# Patient Record
Sex: MALE | Race: WHITE | NOT HISPANIC OR LATINO | Employment: OTHER | URBAN - METROPOLITAN AREA
[De-identification: names, ages, dates, MRNs, and addresses within clinical notes are randomized per-mention and may not be internally consistent; named-entity substitution may affect disease eponyms.]

---

## 2017-06-19 ENCOUNTER — PATIENT OUTREACH (OUTPATIENT)
Dept: HEALTH INFORMATION MANAGEMENT | Facility: OTHER | Age: 72
End: 2017-06-19

## 2017-06-19 NOTE — PROGRESS NOTES
Outcome: Pt. Requested to call back once he figures out schedule    WebIZ Checked & Epic Updated:  yes    HealthConnect Verified: no    Attempt # 1

## 2017-06-20 NOTE — PROGRESS NOTES
Attempt #:2    WebIZ Checked & Epic Updated: yes  HealthConnect Verified: no  Verify PCP: yes    Communication Preference Obtained: yes     Review Care Team: yes    Annual Wellness Visit Scheduling  1. Scheduling Status:Scheduled        Care Gap Scheduling (Attempt to Schedule EACH Overdue Care Gap!)     Health Maintenance Due   Topic Date Due   • Annual Wellness Visit  SCHEDULED          [employer] Activation: sent activation code  Active-Semi Vilma: no  Virtual Visits: no  Opt In to Text Messages: no

## 2017-06-21 ENCOUNTER — APPOINTMENT (OUTPATIENT)
Dept: MEDICAL GROUP | Age: 72
End: 2017-06-21
Payer: MEDICARE

## 2017-08-08 ENCOUNTER — OFFICE VISIT (OUTPATIENT)
Dept: MEDICAL GROUP | Facility: MEDICAL CENTER | Age: 72
End: 2017-08-08
Payer: MEDICARE

## 2017-08-08 DIAGNOSIS — R25.1 TREMORS OF NERVOUS SYSTEM: ICD-10-CM

## 2017-08-08 DIAGNOSIS — N40.0 BENIGN PROSTATIC HYPERPLASIA, PRESENCE OF LOWER URINARY TRACT SYMPTOMS UNSPECIFIED, UNSPECIFIED MORPHOLOGY: ICD-10-CM

## 2017-08-08 DIAGNOSIS — F32.A ANXIETY AND DEPRESSION: ICD-10-CM

## 2017-08-08 DIAGNOSIS — Z98.890 S/P COLONOSCOPY: ICD-10-CM

## 2017-08-08 DIAGNOSIS — E55.9 VITAMIN D INSUFFICIENCY: ICD-10-CM

## 2017-08-08 DIAGNOSIS — R00.1 BRADYCARDIA: ICD-10-CM

## 2017-08-08 DIAGNOSIS — F41.9 ANXIETY AND DEPRESSION: ICD-10-CM

## 2017-08-08 DIAGNOSIS — Z00.00 MEDICARE ANNUAL WELLNESS VISIT, INITIAL: Primary | ICD-10-CM

## 2017-08-08 PROCEDURE — G0438 PPPS, INITIAL VISIT: HCPCS | Performed by: NURSE PRACTITIONER

## 2017-08-08 RX ORDER — MAGNESIUM OXIDE 400 MG/1
400 TABLET ORAL DAILY
COMMUNITY
End: 2022-06-15

## 2017-08-08 ASSESSMENT — PATIENT HEALTH QUESTIONNAIRE - PHQ9
SUM OF ALL RESPONSES TO PHQ QUESTIONS 1-9: 7
5. POOR APPETITE OR OVEREATING: 2 - MORE THAN HALF THE DAYS
CLINICAL INTERPRETATION OF PHQ2 SCORE: 2

## 2017-08-08 NOTE — ASSESSMENT & PLAN NOTE
Today HR 45, asymptomatic, without dizziness, confusion, skin warm and dry, alert  States hr has gone as low as 39 and asymptomatic  Cardiology is encouraging pt to have a pacemaker placed and he is hesitant at this time

## 2017-08-08 NOTE — ASSESSMENT & PLAN NOTE
Chronic condition managed with current medical regimen  Per pt had labs drawn at [facility], was told wnl   Continue with diet management  Followed by Peggy Hassan M.D..

## 2017-08-08 NOTE — PROGRESS NOTES
CC:   Medicare Annual Wellness Visit    HPI:  Samir is a 72 y.o. here for Medicare Annual Wellness Visit    Patient Active Problem List    Diagnosis Date Noted   • Anxiety and depression 05/09/2014   • Bradycardia 04/30/2014   • Tremors of nervous system 05/16/2013   • HLD (hyperlipidemia) 10/24/2012   • Vitamin D insufficiency 09/18/2012   • S/P colonoscopy-2007 neg in [place] 08/23/2012   • BPH (benign prostatic hyperplasia) 08/23/2012     Current Outpatient Prescriptions   Medication Sig Dispense Refill   • magnesium oxide (MAG-OX) 400 MG Tab Take 400 mg by mouth every day.     • aspirin EC (ECOTRIN) 81 MG Tablet Delayed Response Take 81 mg by mouth every day.     • tamsulosin (FLOMAX) 0.4 MG capsule TAKE 1 CAPSULE BY MOUTH ONE-HALF HOUR AFTER BREAKFAST. 90 Cap 3   • polyethylene glycol 3350 (MIRALAX) Powder Take 17 g by mouth every day.     • SAW PALMETTO by Does not apply route.       No current facility-administered medications for this visit.      Current supplements: no  Chronic narcotic pain medicines: no  Allergies: Epinephrine; Morphine; and Sulfa antibiotics  Exercise: yes active  Current social contact/activities: Has support of family and friends      Screening:  Depression Screening    Little interest or pleasure in doing things?  1 - several days  Feeling down, depressed , or hopeless? 1 - several days  Trouble falling or staying asleep, or sleeping too much?  0 - not at all  Feeling tired or having little energy?  2 - more than half the days  Poor appetite or overeating?  2 - more than half the days  Feeling bad about yourself - or that you are a failure or have let yourself or your family down? 1 - several days  Trouble concentrating on things, such as reading the newspaper or watching television? 0 - not at all  Moving or speaking so slowly that other people could have noticed.  Or the opposite - being so fidgety or restless that you have been moving around a lot more than usual?  0 - not at  all  Thoughts that you would be better off dead, or of hurting yourself?  0 - not at all  Patient Health Questionnaire Score: 7    If depressive symptoms identified deferred to follow up visit unless specifically addressed in assessment and plan.    Interpretation of PHQ-9 Total Score   Score Severity   1-4 No Depression   5-9 Mild Depression   10-14 Moderate Depression   15-19 Moderately Severe Depression   20-27 Severe Depression      Screening for Cognitive Impairment    Three Minute Recall (apple, watch, amairani)  3/3    Draw clock face with all 12 numbers set to the hand to show 10 minutes past 11 o'clock  1 5  Cognitive concerns identified deferred for follow up unless specifically addressed in assessment and plan.    Fall Risk Assessment    Has the patient had two or more falls in the last year or any fall with injury in the last year?  No    Safety Assessment    Throw rugs on floor.  Yes  Handrails on all stairs.  Yes  Good lighting in all hallways.  Yes  Difficulty hearing.  No  Patient counseled about all safety risks that were identified.    Functional Assessment ADLs    Are there any barriers preventing you from cooking for yourself or meeting nutritional needs?  No.    Are there any barriers preventing you from driving safely or obtaining transportation?  No.    Are there any barriers preventing you from using a telephone or calling for help?  No.    Are there any barriers preventing you from shopping?  No.    Are there any barriers preventing you from taking care of your own finances?  No.    Are there any barriers preventing you from managing your medications?  No.    Are currently engaging any exercise or physical activity?  No.       Health Maintenance Summary                Annual Wellness Visit Overdue 1945     IMM PNEUMOCOCCAL 65+ (ADULT) LOW/MEDIUM RISK SERIES Postponed 12/20/2017 Originally 6/25/2010. Patient Refused     Done 8/23/1992 Imm Admin: Pneumococcal polysaccharide vaccine (PPSV-23)  "   IMM DTaP/Tdap/Td Vaccine Postponed 12/20/2017 Originally 6/25/1964. Patient Refused    IMM INFLUENZA Next Due 9/1/2017      Done 11/6/2012 Imm Admin: Influenza TIV (IM)    COLONOSCOPY Next Due 8/8/2024      Done 8/8/2014 AMB REFERRAL TO GI FOR COLONOSCOPY          Patient Care Team:  Peggy Hassan M.D. as PCP - General (Internal Medicine)  Alexandru Degroot PA-C as Consulting Physician (Urology)  Chay Sampson M.D. as Consulting Physician (Gastroenterology)  Sriram Flores M.D. as Consulting Physician (Cardiology)        Social History   Substance Use Topics   • Smoking status: Never Smoker    • Smokeless tobacco: Never Used   • Alcohol Use: No       Family History   Problem Relation Age of Onset   • Cancer Mother      Melanoma   • Other Father      low heart rate/pacemaker   • Cancer Father      prostate cancer/ sarcoma   • Lung Disease Paternal Uncle    • Cancer Paternal Uncle    • Stroke Maternal Grandmother    • Hypertension Paternal Grandmother      He  has a past medical history of Enlarged prostate; Heart rate slow; Allergy; and ASTHMA. He also has no past medical history of Encounter for long-term (current) use of other medications.   Past Surgical History   Procedure Laterality Date   • Tonsillectomy     • Open reduction         ROS:    Ostomy or other tubes or amputations: no  Chronic oxygen use no  Last eye exam 2/2017  : Denies incontinence.   Gait: Uses no assistive device   Hearing excellent.    Dentition good    Exam: Blood pressure 112/70, pulse 65, temperature 36.5 °C (97.7 °F), height 1.93 m (6' 4\"), weight 87.998 kg (194 lb), SpO2 98 %. Body mass index is 23.62 kg/(m^2).  Alert, oriented in no acute distress.  Eye contact is good, speech goal directed, affect calm      Assessment and Plan. The following treatment and monitoring plan is recommended for all problems as listed below:   Vitamin D insufficiency  Pt has elected not to take vit d at this time  No current vit d level in " "chart  Followed by Peggy Hassan M.D..     Tremors of nervous system  Per pt \"not so much\"  Has been evaluated by neurology  No intervention at this time    S/P colonoscopy-2007 neg in [place]  Last colonoscopy 2014, no polyps  Next due in 2024    HLD (hyperlipidemia)  Chronic condition managed with current medical regimen  Per pt had labs drawn at [facility], was told wnl   Continue with diet management  Followed by Peggy Hassan M.D..        Bradycardia  Today HR 45, asymptomatic, without dizziness, confusion, skin warm and dry, alert  States hr has gone as low as 39 and asymptomatic  Cardiology is encouraging pt to have a pacemaker placed and he is hesitant at this time    BPH (benign prostatic hyperplasia)  Chronic condition managed with current medical regimen  Stable per review with wakening once nocly, states good stream and able to empty bladder   Continue with current meds  Followed by Peggy Hassan M.D..        Anxiety and depression  Chronic condition managed with current medical regimen  Stable per review,feels well\"   Continue with surveillance  Followed by Peggy Hassan M.D..          Health Care Screening recommendations reviewed with patient today: per Patient Instructions  DPA/Advanced directive: .has an advanced directive - recom a copy be provided    Next office visit for recheck of chronic medical conditions is due with Peggy Hassan M.D. in 6 months          "

## 2017-08-08 NOTE — ASSESSMENT & PLAN NOTE
Chronic condition managed with current medical regimen  Stable per review with wakening once nocly, states good stream and able to empty bladder   Continue with current meds  Followed by Peggy Hassan M.D..

## 2017-08-08 NOTE — PATIENT INSTRUCTIONS
Continue with care through Peggy Hassan M.D..  Next Medicare Annual Wellness Visit is due in one year.    Continue care with specialists you are seeing for your chronic problems.    Attached is some preventative information for you to read.          Fall Prevention and Home Safety  Falls cause injuries and can affect all age groups. It is possible to prevent falls.   HOW TO PREVENT FALLS  · Wear shoes with rubber soles that do not have an opening for your toes.   · Keep the inside and outside of your house well lit.   · Use night lights throughout your home.   · Remove clutter from floors.   · Clean up floor spills.   · Remove throw rugs or fasten them to the floor with carpet tape.   · Do not place electrical cords across pathways.   · Put grab bars by your tub, shower, and toilet. Do not use towel bars as grab bars.   · Put handrails on both sides of the stairway. Fix loose handrails.   · Do not climb on stools or stepladders, if possible.   · Do not wax your floors.   · Repair uneven or unsafe sidewalks, walkways, or stairs.   · Keep items you use a lot within reach.   · Be aware of pets.   · Keep emergency numbers next to the telephone.   · Put smoke detectors in your home and near bedrooms.   Ask your doctor what other things you can do to prevent falls.  Document Released: 10/14/2010 Document Revised: 06/18/2013 Document Reviewed: 03/19/2013  ExitCare® Patient Information ©2013 [email or web address].    Exercise to Stay Healthy      Exercise helps you become and stay healthy.  EXERCISE IDEAS AND TIPS  Choose exercises that:  · You enjoy.   · Fit into your day.   You do not need to exercise really hard to be healthy. You can do exercises at a slow or medium level and stay healthy. You can:  · Stretch before and after working out.   · Try yoga, Pilates, or jesse chi.   · Lift weights.   · Walk fast, swim, jog, run, climb stairs, bicycle, dance, or rollerskate.   · Take aerobic classes.   Exercises that burn about 150  calories:  · Running 1 ½ miles in 15 minutes.   · Playing volleyball for 45 to 60 minutes.   · Washing and waxing a car for 45 to 60 minutes.   · Playing touch football for 45 minutes.   · Walking 1 ¾ miles in 35 minutes.   · Pushing a stroller 1 ½ miles in 30 minutes.   · Playing basketball for 30 minutes.   · Raking leaves for 30 minutes.   · Bicycling 5 miles in 30 minutes.   · Walking 2 miles in 30 minutes.   · Dancing for 30 minutes.   · Shoveling snow for 15 minutes.   · Swimming laps for 20 minutes.   · Walking up stairs for 15 minutes.   · Bicycling 4 miles in 15 minutes.   · Gardening for 30 to 45 minutes.   · Jumping rope for 15 minutes.   · Washing windows or floors for 45 to 60 minutes.     One suggestion is to start walking for 10 minutes after each meal.  This will help with digestion and help you to metabolize your meal.       For Weight Loss -   Recommend portion sizes with more fruits/vegetables/high fiber foods.  Stay away from white bread/pastas/white rice/white potatoes.  Increase Fluid intake to 6-8 glasses of water daily.   Eliminate soda's (diet and regular) from your fluid intake.      Document Released: 01/20/2012 Document Revised: 03/11/2013 Document Reviewed: 01/20/2012  ExitCare® Patient Information ©2013 [employer].    Fat and Cholesterol Control Diet  Cholesterol is a wax-like substance. It comes from your liver and is found in certain foods. There is good (HDL) and bad (LDL) cholesterol. Too much cholesterol in your blood can affect your heart. Certain foods can lower or raise your cholesterol. Eat foods that are low in cholesterol.  Saturated and trans fats are bad fats found in foods that will raise your cholesterol. Do not eat foods that are high in saturated and trans fats.  FOODS HIGHER IN SATURATED AND TRANS FATS  · Dairy products, such as whole milk, eggs, cheese, cream, and butter.   · Fatty meats, such as hot dogs, sausage, and salami.   · Fried foods.   · Trans fats which  are found in margarine and pre-made cookies, crackers, and baked goods.   · Tropical oils, such as coconut and palm oils.   Read package labels at the store. Do not buy products that use saturated or trans fats or hydrogenated oils. Find foods labeled:  · Low-fat.   · Low-saturated fat.   · Trans-fat-free.   · Low-cholesterol.   FOODS LOWER IN CHOLESTEROL  ·  Fruit.   · Vegetables.   · Beans, peas, and lentils.   · Fish.   · Lean meat, such as chicken (without skin) or ground turkey.   · Grains, such as barley, rice, couscous, bulgur wheat, and pasta.   · Heart-healthy tub margarine.   PREPARING YOUR FOOD  · Broil, bake, steam, or roast foods. Do not chatterjee food.   · Use non-stick cooking sprays.   · Use lemon or herbs to flavor food instead of using butter or stick margarine.   · Use nonfat yogurt, salsa, or low-fat dressings for salads.   Document Released: 06/18/2013 Document Reviewed: 06/18/2013  ExitCare® Patient Information ©2013 [employer].    Recommend annual flu vaccine.  Next due in Fall, 2015.  If you decide not to have the flu vaccine, recommend good handwashing and staying out of crowds during flu season.

## 2017-08-08 NOTE — MR AVS SNAPSHOT
"        Samir Kumar   2017 11:00 AM   Office Visit   MRN: [id number]    Department:  [facility]   Dept Phone:  [phone]    Description:  Male : 1945   Provider:  HAKAN Gonzalez           Reason for Visit     Annual Exam initial      Allergies as of 2017     Allergen Noted Reactions    Epinephrine 2016   Unspecified    Blood pressure/pulse goes up.    Morphine 2012       Injected morphine, causes BP to drop    Sulfa Antibiotics [Sulfa Drugs] 2016   Itching      You were diagnosed with     Medicare annual wellness visit, initial   [438381]  -  Primary     Vitamin D insufficiency   [612640]       Tremors of nervous system   [317577]       S/P colonoscopy   [217786]       Bradycardia   [928198]       Benign prostatic hyperplasia, presence of lower urinary tract symptoms unspecified, unspecified morphology   [4805213]       Anxiety and depression   [758200]         Vital Signs     Blood Pressure Pulse Temperature Height Weight Body Mass Index    112/70 mmHg 65 36.5 °C (97.7 °F) 1.93 m (6' 4\") 87.998 kg (194 lb) 23.62 kg/m2    Oxygen Saturation Smoking Status                98% Never Smoker           Basic Information     Date Of Birth Sex Race Ethnicity Preferred Language    1945 Male White Non- English      Problem List              ICD-10-CM Priority Class Noted - Resolved    S/P colonoscopy- neg in [place] Z98.890   2012 - Present    BPH (benign prostatic hyperplasia) N40.0   2012 - Present    Vitamin D insufficiency E55.9   2012 - Present    HLD (hyperlipidemia) E78.5   10/24/2012 - Present    Tremors of nervous system R25.1   2013 - Present    Bradycardia R00.1   2014 - Present    Anxiety and depression F41.9, F32.9   2014 - Present      Health Maintenance        Date Due Completion Dates    IMM PNEUMOCOCCAL 65+ (ADULT) LOW/MEDIUM RISK SERIES (1 of 2 - PCV13) 2017 (Originally 2010) 1992    IMM " DTaP/Tdap/Td Vaccine (1 - Tdap) 12/20/2017 (Originally 6/25/1964) ---    IMM INFLUENZA (1) 9/1/2017 11/6/2012    COLONOSCOPY 8/8/2024 8/8/2014            Current Immunizations     Influenza TIV (IM) 11/6/2012    Pneumococcal polysaccharide vaccine (PPSV-23) 8/23/1992    SHINGLES VACCINE 8/23/2009      Below and/or attached are the medications your provider expects you to take. Review all of your home medications and newly ordered medications with your provider and/or pharmacist. Follow medication instructions as directed by your provider and/or pharmacist. Please keep your medication list with you and share with your provider. Update the information when medications are discontinued, doses are changed, or new medications (including over-the-counter products) are added; and carry medication information at all times in the event of emergency situations     Allergies:  EPINEPHRINE - Unspecified     MORPHINE - (reactions not documented)     SULFA ANTIBIOTICS - Itching               Medications  Valid as of: August 08, 2017 - 11:59 AM    Generic Name Brand Name Tablet Size Instructions for use    Aspirin (Tablet Delayed Response) ECOTRIN 81 MG Take 81 mg by mouth every day.        Magnesium Oxide (Tab) MAG- MG Take 400 mg by mouth every day.        Polyethylene Glycol 3350 (Powder) MIRALAX  Take 17 g by mouth every day.        Saw [place]   by Does not apply route.        Tamsulosin HCl (Cap) FLOMAX 0.4 MG TAKE 1 CAPSULE BY MOUTH ONE-HALF HOUR AFTER BREAKFAST.        .                 Medicines prescribed today were sent to:     [facility][id number] - [place], [place] - [address]    [address] [address]    Phone: [phone] Fax: [phone]    Open 24 Hours?: No    [id number] - [place] (), [place] - [address]    [address] () [place] [address]    Phone: [phone] Fax: [phone]    Open 24 Hours?: No      Medication refill instructions:       If your prescription bottle  indicates you have medication refills left, it is not necessary to call your provider’s office. Please contact your pharmacy and they will refill your medication.    If your prescription bottle indicates you do not have any refills left, you may request refills at any time through one of the following ways: The online Datavail system (except Urgent Care), by calling your provider’s office, or by asking your pharmacy to contact your provider’s office with a refill request. Medication refills are processed only during regular business hours and may not be available until the next business day. Your provider may request additional information or to have a follow-up visit with you prior to refilling your medication.   *Please Note: Medication refills are assigned a new Rx number when refilled electronically. Your pharmacy may indicate that no refills were authorized even though a new prescription for the same medication is available at the pharmacy. Please request the medicine by name with the pharmacy before contacting your provider for a refill.        Instructions    Continue with care through Peggy Hassan M.D..  Next Medicare Annual Wellness Visit is due in one year.    Continue care with specialists you are seeing for your chronic problems.    Attached is some preventative information for you to read.          Fall Prevention and Home Safety  Falls cause injuries and can affect all age groups. It is possible to prevent falls.   HOW TO PREVENT FALLS  · Wear shoes with rubber soles that do not have an opening for your toes.   · Keep the inside and outside of your house well lit.   · Use night lights throughout your home.   · Remove clutter from floors.   · Clean up floor spills.   · Remove throw rugs or fasten them to the floor with carpet tape.   · Do not place electrical cords across pathways.   · Put grab bars by your tub, shower, and toilet. Do not use towel bars as grab bars.   · Put handrails on both sides of  the stairway. Fix loose handrails.   · Do not climb on stools or stepladders, if possible.   · Do not wax your floors.   · Repair uneven or unsafe sidewalks, walkways, or stairs.   · Keep items you use a lot within reach.   · Be aware of pets.   · Keep emergency numbers next to the telephone.   · Put smoke detectors in your home and near bedrooms.   Ask your doctor what other things you can do to prevent falls.  Document Released: 10/14/2010 Document Revised: 06/18/2013 Document Reviewed: 03/19/2013  ExitCare® Patient Information ©2013 [employer].    Exercise to Stay Healthy      Exercise helps you become and stay healthy.  EXERCISE IDEAS AND TIPS  Choose exercises that:  · You enjoy.   · Fit into your day.   You do not need to exercise really hard to be healthy. You can do exercises at a slow or medium level and stay healthy. You can:  · Stretch before and after working out.   · Try yoga, Pilates, or jesse chi.   · Lift weights.   · Walk fast, swim, jog, run, climb stairs, bicycle, dance, or rollerskate.   · Take aerobic classes.   Exercises that burn about 150 calories:  · Running 1 ½ miles in 15 minutes.   · Playing volleyball for 45 to 60 minutes.   · Washing and waxing a car for 45 to 60 minutes.   · Playing touch football for 45 minutes.   · Walking 1 ¾ miles in 35 minutes.   · Pushing a stroller 1 ½ miles in 30 minutes.   · Playing basketball for 30 minutes.   · Raking leaves for 30 minutes.   · Bicycling 5 miles in 30 minutes.   · Walking 2 miles in 30 minutes.   · Dancing for 30 minutes.   · Shoveling snow for 15 minutes.   · Swimming laps for 20 minutes.   · Walking up stairs for 15 minutes.   · Bicycling 4 miles in 15 minutes.   · Gardening for 30 to 45 minutes.   · Jumping rope for 15 minutes.   · Washing windows or floors for 45 to 60 minutes.     One suggestion is to start walking for 10 minutes after each meal.  This will help with digestion and help you to metabolize your meal.       For Weight Loss  -   Recommend portion sizes with more fruits/vegetables/high fiber foods.  Stay away from white bread/pastas/white rice/white potatoes.  Increase Fluid intake to 6-8 glasses of water daily.   Eliminate soda's (diet and regular) from your fluid intake.      Document Released: 01/20/2012 Document Revised: 03/11/2013 Document Reviewed: 01/20/2012  ExitCare® Patient Information ©2013 [facility].    Fat and Cholesterol Control Diet  Cholesterol is a wax-like substance. It comes from your liver and is found in certain foods. There is good (HDL) and bad (LDL) cholesterol. Too much cholesterol in your blood can affect your heart. Certain foods can lower or raise your cholesterol. Eat foods that are low in cholesterol.  Saturated and trans fats are bad fats found in foods that will raise your cholesterol. Do not eat foods that are high in saturated and trans fats.  FOODS HIGHER IN SATURATED AND TRANS FATS  · Dairy products, such as whole milk, eggs, cheese, cream, and butter.   · Fatty meats, such as hot dogs, sausage, and salami.   · Fried foods.   · Trans fats which are found in margarine and pre-made cookies, crackers, and baked goods.   · Tropical oils, such as coconut and palm oils.   Read package labels at the store. Do not buy products that use saturated or trans fats or hydrogenated oils. Find foods labeled:  · Low-fat.   · Low-saturated fat.   · Trans-fat-free.   · Low-cholesterol.   FOODS LOWER IN CHOLESTEROL  ·  Fruit.   · Vegetables.   · Beans, peas, and lentils.   · Fish.   · Lean meat, such as chicken (without skin) or ground turkey.   · Grains, such as barley, rice, couscous, bulgur wheat, and pasta.   · Heart-healthy tub margarine.   PREPARING YOUR FOOD  · Broil, bake, steam, or roast foods. Do not chatterjee food.   · Use non-stick cooking sprays.   · Use lemon or herbs to flavor food instead of using butter or stick margarine.   · Use nonfat yogurt, salsa, or low-fat dressings for salads.   Document Released:  06/18/2013 Document Reviewed: 06/18/2013  ExitCare® Patient Information ©2013 [employer].    Recommend annual flu vaccine.  Next due in Fall, 2015.  If you decide not to have the flu vaccine, recommend good handwashing and staying out of crowds during flu season.                  [email or web address] Access Code: [email or web address]  Expires: 9/7/2017 11:59 AM    [email or web address].com  A secure, online tool to manage your health information     [employer] VeteranCentral.com® is a secure, online tool that connects you to your personalized health information from the privacy of your home -- day or night - making it very easy for you to manage your healthcare. Once the activation process is completed, you can even access your medical information using the VeteranCentral.com vilma, which is available for free in the Apple Vilma store or Google Play store.     [email or web address].com provides the following levels of access (as shown below):   My Chart Features   [facility] Primary [facility] Doctor [facility]  Specialists [facility]  Urgent  Care Non-[facility]  Primary Care  Doctor   Email your healthcare team securely and privately 24/7 X X X    Manage appointments: schedule your next appointment; view details of past/upcoming appointments X      Request prescription refills. X      View recent personal medical records, including lab and immunizations X X X X   View health record, including health history, allergies, medications X X X X   Read reports about your outpatient visits, procedures, consult and ER notes X X X X   See your discharge summary, which is a recap of your hospital and/or ER visit that includes your diagnosis, lab results, and care plan. X X       How to register for [email or web address]:  1. Go to  [email or web address].  2. Click on the Sign Up Now box, which takes you to the New Member Sign Up page. You will need to provide the following information:  a. Enter your VeteranCentral.com Access Code exactly as it appears at the top of this page. (You will not need to use this code after you’ve completed  the sign-up process. If you do not sign up before the expiration date, you must request a new code.)   b. Enter your date of birth.   c. Enter your home email address.   d. Click Submit, and follow the next screen’s instructions.  3. Create a [email or web address] ID. This will be your Bookmatet login ID and cannot be changed, so think of one that is secure and easy to remember.  4. Create a [email or web address] password. You can change your password at any time.  5. Enter your Password Reset Question and Answer. This can be used at a later time if you forget your password.   6. Enter your e-mail address. This allows you to receive e-mail notifications when new information is available in [email or web address].  7. Click Sign Up. You can now view your health information.    For assistance activating your [email or web address] account, call [phone]

## 2017-08-08 NOTE — ASSESSMENT & PLAN NOTE
Pt has elected not to take vit d at this time  No current vit d level in chart  Followed by Peggy Hassan M.D..

## 2017-08-08 NOTE — ASSESSMENT & PLAN NOTE
"Chronic condition managed with current medical regimen  Stable per review,feels well\"   Continue with surveillance  Followed by Peggy Hassan M.D..      "

## 2017-08-09 ENCOUNTER — TELEPHONE (OUTPATIENT)
Dept: MEDICAL GROUP | Age: 72
End: 2017-08-09

## 2017-08-10 VITALS
HEART RATE: 45 BPM | BODY MASS INDEX: 23.62 KG/M2 | HEIGHT: 76 IN | WEIGHT: 194 LBS | SYSTOLIC BLOOD PRESSURE: 112 MMHG | OXYGEN SATURATION: 98 % | TEMPERATURE: 97.7 F | DIASTOLIC BLOOD PRESSURE: 70 MMHG

## 2017-09-25 DIAGNOSIS — N40.0 BENIGN PROSTATIC HYPERPLASIA, PRESENCE OF LOWER URINARY TRACT SYMPTOMS UNSPECIFIED, UNSPECIFIED MORPHOLOGY: ICD-10-CM

## 2017-09-25 RX ORDER — TAMSULOSIN HYDROCHLORIDE 0.4 MG/1
CAPSULE ORAL
Qty: 90 CAP | Refills: 3 | Status: SHIPPED | OUTPATIENT
Start: 2017-09-25 | End: 2018-03-21

## 2018-03-21 ENCOUNTER — OFFICE VISIT (OUTPATIENT)
Dept: MEDICAL GROUP | Age: 73
End: 2018-03-21
Payer: MEDICARE

## 2018-03-21 VITALS
HEART RATE: 68 BPM | OXYGEN SATURATION: 97 % | DIASTOLIC BLOOD PRESSURE: 70 MMHG | SYSTOLIC BLOOD PRESSURE: 95 MMHG | HEIGHT: 76 IN | BODY MASS INDEX: 22.33 KG/M2 | TEMPERATURE: 98.6 F | WEIGHT: 183.4 LBS

## 2018-03-21 DIAGNOSIS — E55.9 VITAMIN D INSUFFICIENCY: ICD-10-CM

## 2018-03-21 DIAGNOSIS — R00.1 BRADYCARDIA: ICD-10-CM

## 2018-03-21 DIAGNOSIS — N40.0 BENIGN PROSTATIC HYPERPLASIA, UNSPECIFIED WHETHER LOWER URINARY TRACT SYMPTOMS PRESENT: ICD-10-CM

## 2018-03-21 DIAGNOSIS — F33.1 MODERATE EPISODE OF RECURRENT MAJOR DEPRESSIVE DISORDER (HCC): ICD-10-CM

## 2018-03-21 DIAGNOSIS — F41.1 GENERALIZED ANXIETY DISORDER: ICD-10-CM

## 2018-03-21 DIAGNOSIS — E78.00 PURE HYPERCHOLESTEROLEMIA: ICD-10-CM

## 2018-03-21 PROCEDURE — 99215 OFFICE O/P EST HI 40 MIN: CPT | Performed by: INTERNAL MEDICINE

## 2018-03-21 RX ORDER — SERTRALINE HYDROCHLORIDE 25 MG/1
25 TABLET, FILM COATED ORAL DAILY
Qty: 30 TAB | Refills: 11 | Status: SHIPPED | OUTPATIENT
Start: 2018-03-21

## 2018-03-21 ASSESSMENT — PATIENT HEALTH QUESTIONNAIRE - PHQ9
CLINICAL INTERPRETATION OF PHQ2 SCORE: 6
SUM OF ALL RESPONSES TO PHQ QUESTIONS 1-9: 27
5. POOR APPETITE OR OVEREATING: 3 - NEARLY EVERY DAY

## 2018-03-22 NOTE — ASSESSMENT & PLAN NOTE
Patient presented to clinic with wife and stated that he has depressed mood and low energy. He has history of depression and anxiety and was treated with Prozac, BuSpar 30 years ago. He stated that he did not tolerate Prozac and medication caused dizzy and nauseous. He followed with psychologist 3 years ago. Patient and wife reported that his psychologist told him that she could not help him anymore. He stopped seeing psychologist. He has not been on antidepressant for more than 10 years. Wife reported that patient spent most of the time in his room and he slept most of the day. Patient also had panic attack in the morning lasts for 3-4 hours. He also refused to participate in any activities. Patient also worries of his blood pressure and heart beat and he check his blood pressure several times a day. He stated that he did not eat or sleep or drinking well. His blood pressure is high in the morning when he has panic attack or if he did not sleep well at night. He has low blood pressure in the evening time or afternoon.   Patient and wife reported that patient had history of very wild and irritability from taking Xanax or Valium.

## 2018-03-22 NOTE — ASSESSMENT & PLAN NOTE
Patient has slightly elevated LDL cholesterol in previous blood test. I reviewed his blood tests with him and his wife in clinic. He has never been on cholesterol medication. Patient has cardiologist at [place].

## 2018-03-22 NOTE — ASSESSMENT & PLAN NOTE
Patient declined to take tamsulosin as he felt palpitation from taking it. He wants to continue over-the-counter saw palmetto.

## 2018-03-22 NOTE — ASSESSMENT & PLAN NOTE
"Patient reported that he has bradycardia and he was evaluated by cardiologist in [place]. He was told to do evaluation for pacemaker but patient declined it. He is asymptomatic currently. However, he is worrying of his blood pressure is not stable. Patient also admits that he is not well hydrated, or sleep enough at night from depression and anxiety. I advised patient to follow-up with cardiologist to evaluate for bradycardia, but patient declined it. He stated that \"I am feeling fine on my heart. I do not have chest pain or palpitation or dizziness. I want to postpone to see cardiologist now.\"  "

## 2018-03-22 NOTE — ASSESSMENT & PLAN NOTE
Patient has history of low vitamin D in the past. He is not taking any supplement currently. Patient feels low energy and reported that he has depressed mood as well.

## 2018-03-22 NOTE — PROGRESS NOTES
Subjective:   Samir Heath is a 72 y.o. male here today for evaluation and management of:      Vitamin D insufficiency  Patient has history of low vitamin D in the past. He is not taking any supplement currently. Patient feels low energy and reported that he has depressed mood as well.    Pure hypercholesterolemia  Patient has slightly elevated LDL cholesterol in previous blood test. I reviewed his blood tests with him and his wife in clinic. He has never been on cholesterol medication. Patient has cardiologist at [place].    Moderate episode of recurrent major depressive disorder (CMS-[facility])  Patient presented to clinic with wife and stated that he has depressed mood and low energy. He has history of depression and anxiety and was treated with Prozac, BuSpar 30 years ago. He stated that he did not tolerate Prozac and medication caused dizzy and nauseous. He followed with psychologist 3 years ago. Patient and wife reported that his psychologist told him that she could not help him anymore. He stopped seeing psychologist. He has not been on antidepressant for more than 10 years. Wife reported that patient spent most of the time in his room and he slept most of the day. Patient also had panic attack in the morning lasts for 3-4 hours. He also refused to participate in any activities. Patient also worries of his blood pressure and heart beat and he check his blood pressure several times a day. He stated that he did not eat or sleep or drinking well. His blood pressure is high in the morning when he has panic attack or if he did not sleep well at night. He has low blood pressure in the evening time or afternoon.   Patient and wife reported that patient had history of very wild and irritability from taking Xanax or Valium.    Bradycardia  Patient reported that he has bradycardia and he was evaluated by cardiologist in [place]. He was told to do evaluation for pacemaker but patient declined it. He is asymptomatic currently.  "However, he is worrying of his blood pressure is not stable. Patient also admits that he is not well hydrated, or sleep enough at night from depression and anxiety. I advised patient to follow-up with cardiologist to evaluate for bradycardia, but patient declined it. He stated that \"I am feeling fine on my heart. I do not have chest pain or palpitation or dizziness. I want to postpone to see cardiologist now.\"    BPH (benign prostatic hyperplasia)  Patient declined to take tamsulosin as he felt palpitation from taking it. He wants to continue over-the-counter saw palmetto.         Current medicines (including changes today)  Current Outpatient Prescriptions   Medication Sig Dispense Refill   • sertraline (ZOLOFT) 25 MG tablet Take 1 Tab by mouth every day. 30 Tab 11   • magnesium oxide (MAG-OX) 400 MG Tab Take 400 mg by mouth every day.     • aspirin EC (ECOTRIN) 81 MG Tablet Delayed Response Take 81 mg by mouth every day.     • polyethylene glycol 3350 (MIRALAX) Powder Take 17 g by mouth every day.     • SAW PALMETTO by Does not apply route.       No current facility-administered medications for this visit.      He  has a past medical history of Allergy; ASTHMA; Enlarged prostate; and Heart rate slow. He also has no past medical history of Encounter for long-term (current) use of other medications.    ROS   No chest pain, no shortness of breath, no abdominal pain       Objective:     Blood pressure (!) 95/70, pulse 68, temperature 37 °C (98.6 °F), height 1.93 m (6' 4\"), weight 83.2 kg (183 lb 6.4 oz), SpO2 97 %. Body mass index is 22.32 kg/m².   Physical Exam:  General: Alert, oriented and no acute distress.  Eye contact is good, speech goal directed, affect calm  HEENT: conjunctiva non-injected, sclera non-icteric.  Oral mucous membranes pink and moist with no lesions.  Pinna normal.   Lungs: Normal respiratory effort, clear to auscultation bilaterally with good excursion.  CV: regular rate and rhythm. No murmurs. "   Abdomen: soft, non distended, nontender, Bowel sound normal.  Ext: no edema, color normal, vascularity normal, temperature normal        Assessment and Plan:   The following treatment plan was discussed     1. Moderate episode of recurrent major depressive disorder (CMS-HCC)  - After long discussion and review of the risks and benefits of medication, Patient and wife agree to take Zoloft. I advised patient to take Zoloft 25 mg one tablet once a day. He can take Zoloft at night if he feels that Zoloft causing drowsy or sleepy.  - Discussed with patient regarding the use and side effects of medication as well as black box warning of suicidality risk with medication. Patient verbally understands. Recommend to call 911 or go to ER if patient has suicidal ideation or plan.   - Refer to psychiatrist and psychologist.   - REFERRAL TO PSYCHIATRY  - REFERRAL TO PSYCHOLOGY  - sertraline (ZOLOFT) 25 MG tablet; Take 1 Tab by mouth every day.  Dispense: 30 Tab; Refill: 11  - CBC WITH DIFFERENTIAL; Future  - COMP METABOLIC PANEL; Future  - TSH; Future  - FREE THYROXINE; Future    2. Generalized anxiety disorder  - Patient and wife agree to take Zoloft. They declined to take benzodiazepine as patient has history of irritability from taking Xanax or Valium.  - Refer to psychiatrist and psychologist.  - Counseling for coping stress and relaxation technique as well.  - REFERRAL TO PSYCHIATRY  - REFERRAL TO PSYCHOLOGY  - sertraline (ZOLOFT) 25 MG tablet; Take 1 Tab by mouth every day.  Dispense: 30 Tab; Refill: 11  - CBC WITH DIFFERENTIAL; Future  - COMP METABOLIC PANEL; Future    3. Vitamin D insufficiency  - Will recheck vitamin D level.  - VITAMIN D,25 HYDROXY; Future    4. Pure hypercholesterolemia  - Continue to control with diet. I encouraged him to do regular walking exercise. Recheck lab before next follow-up visit.  - COMP METABOLIC PANEL; Future  - LIPID PROFILE; Future    5. Bradycardia  - Asymptomatic. He declined to  follow with cardiologist currently. Please see history of present illness for detailed discussion.  - Patient has fluctuating blood pressure. It is likely due to not eating well or hydrating well or sleep well. His blood pressure is mostly high in the morning when he wakes up. Wife stated that patient have panic attacks in the morning when he wakes up. Patient denied headache, chest pain, dizziness, palpitation, sweating, nausea, vomiting when he has panic attack or elevated blood pressure.  - TSH; Future  - FREE THYROXINE; Future    6. Benign prostatic hyperplasia, unspecified whether lower urinary tract symptoms present  - Patient declined to take tamsulosin as he felt palpitation or irregular heartbeat from taking it.  - Patient insists to continue over-the-counter saw palmetto only.    Face-to-face time spent 40 minutes with patient and more than half of that time spent for counseling and cooperating of care for medical problems listed above.         Followup: Return in about 6 weeks (around 5/2/2018), or if symptoms worsen or fail to improve, for depression, general anxiety disorder, hyperlipidemia, vitamin D insufficiency, bradycardia, BPH, lab.      Please note that this dictation was created using voice recognition software. I have made every reasonable attempt to correct obvious errors, but I expect that there may have unintended errors in text, spelling, punctuation, or grammar that I did not discover.

## 2018-03-23 ENCOUNTER — HOSPITAL ENCOUNTER (OUTPATIENT)
Dept: LAB | Facility: MEDICAL CENTER | Age: 73
End: 2018-03-23
Attending: INTERNAL MEDICINE
Payer: MEDICARE

## 2018-03-23 DIAGNOSIS — F41.1 GENERALIZED ANXIETY DISORDER: ICD-10-CM

## 2018-03-23 DIAGNOSIS — E78.00 PURE HYPERCHOLESTEROLEMIA: ICD-10-CM

## 2018-03-23 DIAGNOSIS — E55.9 VITAMIN D INSUFFICIENCY: ICD-10-CM

## 2018-03-23 DIAGNOSIS — F33.1 MODERATE EPISODE OF RECURRENT MAJOR DEPRESSIVE DISORDER (HCC): ICD-10-CM

## 2018-03-23 DIAGNOSIS — R00.1 BRADYCARDIA: ICD-10-CM

## 2018-03-23 LAB
25(OH)D3 SERPL-MCNC: 19 NG/ML (ref 30–100)
ALBUMIN SERPL BCP-MCNC: 4.4 G/DL (ref 3.2–4.9)
ALBUMIN/GLOB SERPL: 1.8 G/DL
ALP SERPL-CCNC: 79 U/L (ref 30–99)
ALT SERPL-CCNC: 11 U/L (ref 2–50)
ANION GAP SERPL CALC-SCNC: 6 MMOL/L (ref 0–11.9)
AST SERPL-CCNC: 13 U/L (ref 12–45)
BASOPHILS # BLD AUTO: 0.7 % (ref 0–1.8)
BASOPHILS # BLD: 0.04 K/UL (ref 0–0.12)
BILIRUB SERPL-MCNC: 1 MG/DL (ref 0.1–1.5)
BUN SERPL-MCNC: 26 MG/DL (ref 8–22)
CALCIUM SERPL-MCNC: 9.7 MG/DL (ref 8.5–10.5)
CHLORIDE SERPL-SCNC: 106 MMOL/L (ref 96–112)
CHOLEST SERPL-MCNC: 171 MG/DL (ref 100–199)
CO2 SERPL-SCNC: 27 MMOL/L (ref 20–33)
CREAT SERPL-MCNC: 0.83 MG/DL (ref 0.5–1.4)
EOSINOPHIL # BLD AUTO: 0.1 K/UL (ref 0–0.51)
EOSINOPHIL NFR BLD: 1.8 % (ref 0–6.9)
ERYTHROCYTE [DISTWIDTH] IN BLOOD BY AUTOMATED COUNT: 41.7 FL (ref 35.9–50)
GLOBULIN SER CALC-MCNC: 2.5 G/DL (ref 1.9–3.5)
GLUCOSE SERPL-MCNC: 100 MG/DL (ref 65–99)
HCT VFR BLD AUTO: 48 % (ref 42–52)
HDLC SERPL-MCNC: 48 MG/DL
HGB BLD-MCNC: 16.1 G/DL (ref 14–18)
IMM GRANULOCYTES # BLD AUTO: 0.01 K/UL (ref 0–0.11)
IMM GRANULOCYTES NFR BLD AUTO: 0.2 % (ref 0–0.9)
LDLC SERPL CALC-MCNC: 107 MG/DL
LYMPHOCYTES # BLD AUTO: 1.87 K/UL (ref 1–4.8)
LYMPHOCYTES NFR BLD: 33.8 % (ref 22–41)
MCH RBC QN AUTO: 28.6 PG (ref 27–33)
MCHC RBC AUTO-ENTMCNC: 33.5 G/DL (ref 33.7–35.3)
MCV RBC AUTO: 85.3 FL (ref 81.4–97.8)
MONOCYTES # BLD AUTO: 0.36 K/UL (ref 0–0.85)
MONOCYTES NFR BLD AUTO: 6.5 % (ref 0–13.4)
NEUTROPHILS # BLD AUTO: 3.16 K/UL (ref 1.82–7.42)
NEUTROPHILS NFR BLD: 57 % (ref 44–72)
NRBC # BLD AUTO: 0 K/UL
NRBC BLD-RTO: 0 /100 WBC
PLATELET # BLD AUTO: 200 K/UL (ref 164–446)
PMV BLD AUTO: 11.7 FL (ref 9–12.9)
POTASSIUM SERPL-SCNC: 3.9 MMOL/L (ref 3.6–5.5)
PROT SERPL-MCNC: 6.9 G/DL (ref 6–8.2)
RBC # BLD AUTO: 5.63 M/UL (ref 4.7–6.1)
SODIUM SERPL-SCNC: 139 MMOL/L (ref 135–145)
T4 FREE SERPL-MCNC: 1.04 NG/DL (ref 0.53–1.43)
TRIGL SERPL-MCNC: 82 MG/DL (ref 0–149)
TSH SERPL DL<=0.005 MIU/L-ACNC: 1.37 UIU/ML (ref 0.38–5.33)
WBC # BLD AUTO: 5.5 K/UL (ref 4.8–10.8)

## 2018-03-23 PROCEDURE — 80061 LIPID PANEL: CPT

## 2018-03-23 PROCEDURE — 36415 COLL VENOUS BLD VENIPUNCTURE: CPT

## 2018-03-23 PROCEDURE — 82306 VITAMIN D 25 HYDROXY: CPT

## 2018-03-23 PROCEDURE — 80053 COMPREHEN METABOLIC PANEL: CPT

## 2018-03-23 PROCEDURE — 84439 ASSAY OF FREE THYROXINE: CPT

## 2018-03-23 PROCEDURE — 85025 COMPLETE CBC W/AUTO DIFF WBC: CPT

## 2018-03-23 PROCEDURE — 84443 ASSAY THYROID STIM HORMONE: CPT

## 2018-03-29 ENCOUNTER — TELEPHONE (OUTPATIENT)
Dept: MEDICAL GROUP | Age: 73
End: 2018-03-29

## 2018-03-29 NOTE — TELEPHONE ENCOUNTER
Phone Number Called: Samir Heath      Message: Pt states he is taking generic for zoloft and cutting them in half, he likes taking half I advised him to take a full dose as prescibed he requested that I ask Dr. Hassan if he can take a half dose.  I stated I would call him back with her answer. He didn't have a reason to  Take half besides the fact that that's what he wanted to do.     Left Message for patient to call back: N\A

## 2018-03-29 NOTE — TELEPHONE ENCOUNTER
Please inform patient that he can cut down Zoloft to half tablet if his depression and anxiety are stable and well controlled. He is already on low dose Zoloft 25 mg daily. If his depression and anxiety gets worse after cutting down to half pill of Zoloft 25 mg daily, he needs to increase the dose to Zoloft 25 mg one tablet daily.    Thanks!  Peggy Hassan M.D.

## 2018-04-11 ENCOUNTER — TELEPHONE (OUTPATIENT)
Dept: MEDICAL GROUP | Age: 73
End: 2018-04-11

## 2018-04-11 DIAGNOSIS — E55.9 VITAMIN D DEFICIENCY: ICD-10-CM

## 2018-04-11 DIAGNOSIS — R00.1 BRADYCARDIA: ICD-10-CM

## 2018-04-11 DIAGNOSIS — R53.83 FATIGUE, UNSPECIFIED TYPE: ICD-10-CM

## 2018-04-11 RX ORDER — ERGOCALCIFEROL 1.25 MG/1
50000 CAPSULE ORAL
Qty: 12 CAP | Refills: 3 | Status: SHIPPED | OUTPATIENT
Start: 2018-04-11 | End: 2022-06-15

## 2018-04-11 NOTE — TELEPHONE ENCOUNTER
1. Caller Name: Samir Heath                                           Call Back Number: [phone] (home)     1. Pt has been in bed for four days, no energy, only eating once/day, energy 30% worse than when in office last visit, sweating a lot during the day and at night. Anxiety went away 60-70%, pt interested in taking the whole pill in one to two days       Pt is taking zoloft 12.5mg/day (which is half prescribed dose)    2. Lab results           Patient approves a detailed voicemail message: N\A

## 2018-04-11 NOTE — TELEPHONE ENCOUNTER
Please inform patient that I refer him to cardiology to reassess his bradycardia as he is feeling more fatigued lately.  He will receive a call from referral coordinator within a week.    Thanks!  Peggy Hassan M.D.

## 2018-04-11 NOTE — TELEPHONE ENCOUNTER
Please inform patient that he has low vitamin D at 19 on recent blood test on 3/23/18. I will prescribe ergocalciferol 50,000 units to take one capsule once a week for vitamin D supplement. Low vitamin D can cause tiredness and fatigue. Please also advised patient to schedule with psychiatrist and psychologist, which is already approved. Please advise patient to call [phone] to schedule with psychiatrist and psychologist to discuss his depression treatment. He may increase Zoloft to 25 mg one tablet daily if he tolerates.  His other blood tests showed that he has elevated BUN at 26 that means he needs to drink more water.  He has slightly increased blood sugar at 100. His thyroid hormone are within normal. He does not have anemia.  His cholesterol level is stable. We will discuss blood tests in detail and his upcoming appointment on 5/2/18.    Thanks!  Peggy Hassan M.D.

## 2018-04-11 NOTE — TELEPHONE ENCOUNTER
VOICEMAIL  1. Caller Name: Samir Heath                        Call Back Number: [phone] (home)       2. Message: Pt. Informed via detailed message    3. Patient approves office to leave a detailed voicemail/MyChart message: yes

## 2018-04-11 NOTE — TELEPHONE ENCOUNTER
FYI + Concern    Discussed last message with patient who states he understands and will make appts and fill rx., he's not sure about increasing dose to 25 mg yet but will keep us updated.       Concerns:   Cardiologist not concerned about pt (cyanosis)  lips turning blue, lips have been blue for 48 hours and patient has marked decrease appetite even worse than usual.

## 2018-04-24 ENCOUNTER — OFFICE VISIT (OUTPATIENT)
Dept: MEDICAL GROUP | Age: 73
End: 2018-04-24
Payer: MEDICARE

## 2018-04-24 ENCOUNTER — HOSPITAL ENCOUNTER (OUTPATIENT)
Dept: LAB | Facility: MEDICAL CENTER | Age: 73
End: 2018-04-24
Attending: INTERNAL MEDICINE
Payer: MEDICARE

## 2018-04-24 VITALS
BODY MASS INDEX: 21.43 KG/M2 | RESPIRATION RATE: 16 BRPM | SYSTOLIC BLOOD PRESSURE: 104 MMHG | WEIGHT: 176 LBS | TEMPERATURE: 97.6 F | DIASTOLIC BLOOD PRESSURE: 72 MMHG | OXYGEN SATURATION: 96 % | HEIGHT: 76 IN | HEART RATE: 64 BPM

## 2018-04-24 DIAGNOSIS — E55.9 VITAMIN D DEFICIENCY: ICD-10-CM

## 2018-04-24 DIAGNOSIS — F33.1 MODERATE EPISODE OF RECURRENT MAJOR DEPRESSIVE DISORDER (HCC): ICD-10-CM

## 2018-04-24 DIAGNOSIS — Z12.5 PROSTATE CANCER SCREENING: ICD-10-CM

## 2018-04-24 DIAGNOSIS — R63.0 ANOREXIA: ICD-10-CM

## 2018-04-24 DIAGNOSIS — R97.20 ELEVATED PSA: ICD-10-CM

## 2018-04-24 DIAGNOSIS — R68.81 EARLY SATIETY: ICD-10-CM

## 2018-04-24 DIAGNOSIS — R00.1 BRADYCARDIA: ICD-10-CM

## 2018-04-24 LAB
25(OH)D3 SERPL-MCNC: 35 NG/ML (ref 30–100)
PSA SERPL-MCNC: 6.29 NG/ML (ref 0–4)

## 2018-04-24 PROCEDURE — 84153 ASSAY OF PSA TOTAL: CPT | Mod: GA

## 2018-04-24 PROCEDURE — 99215 OFFICE O/P EST HI 40 MIN: CPT | Performed by: INTERNAL MEDICINE

## 2018-04-24 PROCEDURE — 82306 VITAMIN D 25 HYDROXY: CPT

## 2018-04-24 PROCEDURE — 36415 COLL VENOUS BLD VENIPUNCTURE: CPT | Mod: GA

## 2018-04-24 RX ORDER — OMEPRAZOLE 20 MG/1
20 CAPSULE, DELAYED RELEASE ORAL DAILY
Qty: 30 CAP | Refills: 1 | Status: SHIPPED | OUTPATIENT
Start: 2018-04-24 | End: 2022-06-15

## 2018-04-24 ASSESSMENT — PATIENT HEALTH QUESTIONNAIRE - PHQ9
5. POOR APPETITE OR OVEREATING: 0 - NOT AT ALL
CLINICAL INTERPRETATION OF PHQ2 SCORE: 6
SUM OF ALL RESPONSES TO PHQ QUESTIONS 1-9: 13

## 2018-04-24 NOTE — ASSESSMENT & PLAN NOTE
Patient has bradycardia. He reported feeling tired, fatigued and wife state that he had almost fainting at home in the past few weeks. He was seen by [facility] and was told to evaluate for pacemaker placement. He has not follow with them yet. I did refer him back to cardiologist and I printed out contact information of [facility]ologist to Patient and his wife to schedule with cardiologist as soon as possible. He denied chest pain or shortness of breath or dizziness currently.

## 2018-04-24 NOTE — PROGRESS NOTES
Subjective:   Samir Heath is a 72 y.o. male here today for evaluation and management of:      Anorexia  Patient and the wife reported that patient has very poor appetite and early satiety. He feels full on his stomach after a few bites. He has been losing weight. Patient's wife state that he eats healthy diet most of the time. Patient denied blood in stool or black tarry stool or abdomen pain. He feels nauseous if he eats.    Bradycardia  Patient has bradycardia. He reported feeling tired, fatigued and wife state that he had almost fainting at home in the past few weeks. He was seen by [facility] cardiologist and was told to evaluate for pacemaker placement. He has not follow with them yet. I did refer him back to cardiologist and I printed out contact information of [facility] cardiologist to Patient and his wife to schedule with cardiologist as soon as possible. He denied chest pain or shortness of breath or dizziness currently.    Moderate episode of recurrent major depressive disorder (CMS-HCC)  Patient states that he only takes Zoloft 25 mg half tablet at night. He has sweating and feeling tired from taking Zoloft 25 mg one tablet. He still has depressed mood, but is stable and able to control. He denies suicidal ideation or plan or homicidal ideation or plan. He is able to sleep well. I referred him to psychiatrist and psychologist. He has not scheduled with them yet. I printed out contact information for behavioral health and advised patient to follow with them. I did discussed to switch to a different antidepressant. The patient wanted to keep Zoloft as he felt that he is doing okay with Zoloft currently. I advised patient to try to increase Zoloft to 25 mg one tablet at night if he tolerates.    Vitamin D deficiency  Patient has very low vitamin D level at 19 in recent blood tests on 3/23/18. I prescribed ergocalciferol 50,000 units to take once a week. Patient states that he start taking  "ergocalciferol once a week for 2 weeks already. He denies side effects from taking ergocalciferol. He is still feeling tired and fatigued as well. His thyroid function test all within normal. He also has normal CMP except slightly high fasting blood sugar at 100 and slightly elevated BUN at 26. He does not have anemia on recent blood test and normal CBC and differential white cell count on 3/23/18.         Current medicines (including changes today)  Current Outpatient Prescriptions   Medication Sig Dispense Refill   • omeprazole (PRILOSEC) 20 MG delayed-release capsule Take 1 Cap by mouth every day. 30 Cap 1   • ergocalciferol (DRISDOL) 40464 UNIT capsule Take 1 Cap by mouth every 7 days. 12 Cap 3   • sertraline (ZOLOFT) 25 MG tablet Take 1 Tab by mouth every day. 30 Tab 11   • magnesium oxide (MAG-OX) 400 MG Tab Take 400 mg by mouth every day.     • aspirin EC (ECOTRIN) 81 MG Tablet Delayed Response Take 81 mg by mouth every day.     • polyethylene glycol 3350 (MIRALAX) Powder Take 17 g by mouth every day.     • SAW PALMETTO by Does not apply route.       No current facility-administered medications for this visit.      He  has a past medical history of Allergy; ASTHMA; Enlarged prostate; and Heart rate slow. He also has no past medical history of Encounter for long-term (current) use of other medications.    ROS   No chest pain, no shortness of breath, no abdominal pain       Objective:     Blood pressure 104/72, pulse 64, temperature 36.4 °C (97.6 °F), resp. rate 16, height 1.93 m (6' 4\"), weight 79.8 kg (176 lb), SpO2 96 %. Body mass index is 21.42 kg/m².   Physical Exam:  General: Alert, oriented and no acute distress.  Eye contact is good, speech goal directed, affect calm  HEENT: conjunctiva non-injected, sclera non-icteric.  Oral mucous membranes pink and moist with no lesions.  Pinna normal.   Lungs: Normal respiratory effort, clear to auscultation bilaterally with good excursion.  CV: regular rate and " rhythm. No murmurs.   Abdomen: soft, non distended, nontender, Bowel sound normal.  Ext: no edema, color normal, vascularity normal, temperature normal      Assessment and Plan:   The following treatment plan was discussed     1. Moderate episode of recurrent major depressive disorder (CMS-HCC)  - We discussed the risks and benefits of antidepressants and Zoloft treatment. Patient states that he is okay with Zoloft 25 mg half tablet daily at bedtime. We discussed to try to increase the dose of Zoloft to 25 mg daily at bedtime if he tolerates.  - Advised patient to follow-up with psychiatrist and psychologist for depression and anxiety.  - I also discussed different other antidepressant with patient. He does not want to switch to other medication. Patient and wife report that he has history of irritability when he took Xanax or valium in the past.   - Discussed with patient regarding the use and side effects of medication as well as black box warning of suicidality risk with medication. Patient verbally understands. Recommend to call 911 or go to ER if patient has suicidal ideation or plan.    - Patient has been identified as being depressed and appropriate orders and counseling have been given    2. Vitamin D deficiency  - Continue ergocalciferol 50,000 units one capsule once a week. Recheck vitamin D in 6 months.  - VITAMIN D,25 HYDROXY; Future    3. Bradycardia  - Advised patient to follow with cardiologist as soon as possible for further evaluation and treatment. Patient is advised to go to ER if he has any dizzy shortness of breath or chest pain or syncopal.    4. Anorexia  - We discussed balanced diet, nutrition and hydration. I also advised patient and wife to that patient eats small amount of meals frequently. We will try omeprazole 20 mg one tablet 30 minutes before first meal.  - Referred to GI consult for further evaluation  - REFERRAL TO GASTROENTEROLOGY  - omeprazole (PRILOSEC) 20 MG delayed-release  capsule; Take 1 Cap by mouth every day.  Dispense: 30 Cap; Refill: 1    5. Early satiety  - Patient has anorexia, early satiety and weight loss. Referred to GI consult and for further evaluation.  - We discussed for age-appropriate cancer screening. He has colonoscopy with GI consult on 8/8/14 and it is not due to repeat it. He has normal CBC, CMP and normal thyroid function, except slightly elevated BUN at 26 and slightly high fasting blood sugar at 100. He has low vitamin D at 19, for which he is going to take ergocalciferol 50,000 units once a week.  - REFERRAL TO GASTROENTEROLOGY  - omeprazole (PRILOSEC) 20 MG delayed-release capsule; Take 1 Cap by mouth every day.  Dispense: 30 Cap; Refill: 1    6. Prostate cancer screening  - Discussed pros and cons of PSA test as well as sensitivity and specificity of the test with patient. He would like to have early detection and request to order PSA test.  - PROSTATE SPECIFIC AG SCREENING; Future    Face-to-face time spent 40 minutes with patient and more than half of that time spent for counseling and cooperating of care for medical problems listed above.       Followup: Return in about 6 months (around 10/24/2018), or if symptoms worsen or fail to improve, for vitamin D insufficiency, depression, bradycardia, BPH, lab review.      Please note that this dictation was created using voice recognition software. I have made every reasonable attempt to correct obvious errors, but I expect that there may have unintended errors in text, spelling, punctuation, or grammar that I did not discover.

## 2018-04-24 NOTE — ASSESSMENT & PLAN NOTE
Patient and the wife reported that patient has very poor appetite and early satiety. He feels full on his stomach after a few bites. He has been losing weight. Patient's wife state that he eats healthy diet most of the time. Patient denied blood in stool or black tarry stool or abdomen pain. He feels nauseous if he eats.

## 2018-04-24 NOTE — ASSESSMENT & PLAN NOTE
Patient states that he only takes Zoloft 25 mg half tablet at night. He has sweating and feeling tired from taking Zoloft 25 mg one tablet. He still has depressed mood, but is stable and able to control. He denies suicidal ideation or plan or homicidal ideation or plan. He is able to sleep well. I referred him to psychiatrist and psychologist. He has not scheduled with them yet. I printed out contact information for behavioral health and advised patient to follow with them. I did discussed to switch to a different antidepressant. The patient wanted to keep Zoloft as he felt that he is doing okay with Zoloft currently. I advised patient to try to increase Zoloft to 25 mg one tablet at night if he tolerates.

## 2018-04-24 NOTE — ASSESSMENT & PLAN NOTE
Patient has very low vitamin D level at 19 in recent blood tests on 3/23/18. I prescribed ergocalciferol 50,000 units to take once a week. Patient states that he start taking ergocalciferol once a week for 2 weeks already. He denies side effects from taking ergocalciferol. He is still feeling tired and fatigued as well. His thyroid function test all within normal. He also has normal CMP except slightly high fasting blood sugar at 100 and slightly elevated BUN at 26. He does not have anemia on recent blood test and normal CBC and differential white cell count on 3/23/18.

## 2018-04-25 ENCOUNTER — TELEPHONE (OUTPATIENT)
Dept: MEDICAL GROUP | Age: 73
End: 2018-04-25

## 2018-04-25 NOTE — TELEPHONE ENCOUNTER
Phone Number Called: [phone] (home)       Message: left a voice mail to pt    Left Message for patient to call back: yes

## 2018-04-25 NOTE — TELEPHONE ENCOUNTER
----- Message from Peggy Hassan M.D. sent at 4/24/2018 10:23 PM PDT -----  Please call to inform patient that his PSA level is high at 6.29. I will refer him to urologist for further assessment and treatment. He will receive a call from referral coordinator in one week.  He does not require to check his vitamin D today. I advised patient and wife to do vitamin D test in 6 months. However, lab ran vitamin D test. His repeated vitamin D level improved, but I will recommend him to continue ergocalciferol 50,000 units once a week.    Thanks!    Peggy Hassan M.D.

## 2018-04-30 NOTE — TELEPHONE ENCOUNTER
Phone Number Called: [phone] (home)     Message: Informed Pt. Pt stated he stopped taking zoloft and is still having energy issues. He is not having much energy but is eating better. Pt will try to make an appointment with his urologist in [place] but if not he will use the referral to get into a urologist.    Left Message for patient to call back: N\A

## 2018-06-05 ENCOUNTER — TELEPHONE (OUTPATIENT)
Dept: MEDICAL GROUP | Age: 73
End: 2018-06-05

## 2018-06-05 DIAGNOSIS — F41.9 ANXIETY AND DEPRESSION: ICD-10-CM

## 2018-06-05 DIAGNOSIS — F32.A ANXIETY AND DEPRESSION: ICD-10-CM

## 2018-06-05 RX ORDER — ALPRAZOLAM 0.25 MG/1
0.25 TABLET ORAL NIGHTLY PRN
Qty: 30 TAB | Refills: 0 | Status: SHIPPED
Start: 2018-06-05 | End: 2018-07-05

## 2018-06-05 NOTE — TELEPHONE ENCOUNTER
1. Caller Name: Samir Heath                                           Call Back Number: [phone] (home)         Patient approves a detailed voicemail message: yes    Pt is still having intermittent anxiety.  Pt is interested in Xanax PRN as he has stopped taking Zoloft.

## 2018-06-05 NOTE — TELEPHONE ENCOUNTER
1. Caller Name: Samir Heath                                           Call Back Number: [phone] (home)         Patient approves a detailed voicemail message: yes    Pt received a call from his pharmacy saying he had Sertraline ready to be picked up.  Pt stressed that he did not want to take sertraline because he did not like how it made him feel, but would like something he could take PRN for anxiety.

## 2018-06-06 NOTE — TELEPHONE ENCOUNTER
Please inform patient that short acting medication as needed like Xanax is not good for his age and he also reported having irritability from taking Xanax and Valium before.  If he really want to retry Xanax, I will give him low-dose xanax to try temporarily only.  Xanax or any benzodiazepine group is not recommended to take chronically.    I also referred him to behavioral health in previous visit I want him to follow-up with them.    Thanks!  Peggy Hassan M.D.

## 2018-06-06 NOTE — TELEPHONE ENCOUNTER
Prescription faxed to:    [facility] #[id number] - [place] - [address]  [address] [facility]  [place] [address]  Phone: [phone] Fax: [phone]  .

## 2018-06-14 ENCOUNTER — TELEPHONE (OUTPATIENT)
Dept: MEDICAL GROUP | Age: 73
End: 2018-06-14

## 2018-06-14 DIAGNOSIS — R00.1 BRADYCARDIA: ICD-10-CM

## 2018-06-14 NOTE — TELEPHONE ENCOUNTER
Please call to inform patient that I already placed a referral to [facility] on 4/11/18.    I will replace new cardiologist referral again since his peripheral cardiologist is not available.     Thanks!  Peggy Hassan M.D.

## 2018-06-14 NOTE — TELEPHONE ENCOUNTER
1. Caller Name: Samir Heath                                           Call Back Number: [phone] (home)         Patient approves a detailed voicemail message: N\A    2. SPECIFIC Action To Be Taken: Referral pending, please sign.    3. Diagnosis/Clinical Reason for Request: bradychardia    4. Specialty & Provider Name/Lab/Imaging Location: Cardiology    5. Is appointment scheduled for requested order/referral: no    Patient was not informed they will receive a return phone call from the office ONLY if there are any questions before processing their request. Advised to call back if they haven't received a call from the referral department in 5 days.

## 2018-06-18 NOTE — TELEPHONE ENCOUNTER
Phone Number Called: [phone] (home)        Message: Called and spoke with patient to inform of referral. Patient is also was provided phone number for cardiology to contact and schedule appointment, [phone]    Left Message for patient to call back: N\A

## 2018-10-23 ENCOUNTER — TELEPHONE (OUTPATIENT)
Dept: MEDICAL GROUP | Age: 73
End: 2018-10-23

## 2018-10-23 NOTE — TELEPHONE ENCOUNTER
ESTABLISHED PATIENT PRE-VISIT PLANNING     Note: Patient will not be contacted if there is no indication to call.     1.  Reviewed notes from the last few office visits within the medical group: Yes    2.  If any orders were placed at last visit or intended to be done for this visit (i.e. 6 mos follow-up), do we have Results/Consult Notes?        •  Labs - Labs were not ordered at last office visit.   Note: If patient appointment is for lab review and patient did not complete labs, check with provider if OK to reschedule patient until labs completed.       •  Imaging - Imaging was not ordered at last office visit.       •  Referrals - Referral ordered, patient was seen and consult notes are in chart. Care Teams updated  YES.    3. Is this appointment scheduled as a Hospital Follow-Up? No    4.  Immunizations were updated in Epic using WebIZ?: Epic matches WebIZ       •  Web Iz Recommendations: FLU, PREVNAR (PCV13) , TDAP and ZOSTAVAX (Shingles)    5.  Patient is due for the following Health Maintenance Topics:   Health Maintenance Due   Topic Date Due   • IMM DTaP/Tdap/Td Vaccine (1 - Tdap) 06/25/1964   • IMM ZOSTER VACCINES (2 of 3) 10/18/2009   • IMM PNEUMOCOCCAL 65+ (ADULT) LOW/MEDIUM RISK SERIES (1 of 2 - PCV13) 06/25/2010   • Annual Wellness Visit  08/09/2018   • IMM INFLUENZA (1) 09/01/2018       - Patient is up-to-date on all Health Maintenance topics. No records have been requested at this time.    6.  MDX printed for Provider? NO    7.  Patient was NOT informed to arrive 15 min prior to their scheduled appointment and bring in their medication bottles.

## 2018-10-24 ENCOUNTER — APPOINTMENT (OUTPATIENT)
Dept: MEDICAL GROUP | Age: 73
End: 2018-10-24
Payer: MEDICARE

## 2020-01-09 ENCOUNTER — APPOINTMENT (RX ONLY)
Dept: URBAN - METROPOLITAN AREA CLINIC 4 | Facility: CLINIC | Age: 75
Setting detail: DERMATOLOGY
End: 2020-01-09

## 2020-01-09 DIAGNOSIS — L57.0 ACTINIC KERATOSIS: ICD-10-CM

## 2020-01-09 DIAGNOSIS — D22 MELANOCYTIC NEVI: ICD-10-CM

## 2020-01-09 DIAGNOSIS — L82.1 OTHER SEBORRHEIC KERATOSIS: ICD-10-CM

## 2020-01-09 DIAGNOSIS — L82.0 INFLAMED SEBORRHEIC KERATOSIS: ICD-10-CM

## 2020-01-09 DIAGNOSIS — Z85.828 PERSONAL HISTORY OF OTHER MALIGNANT NEOPLASM OF SKIN: ICD-10-CM

## 2020-01-09 DIAGNOSIS — Z71.89 OTHER SPECIFIED COUNSELING: ICD-10-CM

## 2020-01-09 DIAGNOSIS — L81.4 OTHER MELANIN HYPERPIGMENTATION: ICD-10-CM

## 2020-01-09 DIAGNOSIS — Z80.8 FAMILY HISTORY OF MALIGNANT NEOPLASM OF OTHER ORGANS OR SYSTEMS: ICD-10-CM

## 2020-01-09 DIAGNOSIS — D18.0 HEMANGIOMA: ICD-10-CM

## 2020-01-09 PROBLEM — D48.5 NEOPLASM OF UNCERTAIN BEHAVIOR OF SKIN: Status: ACTIVE | Noted: 2020-01-09

## 2020-01-09 PROBLEM — D22.72 MELANOCYTIC NEVI OF LEFT LOWER LIMB, INCLUDING HIP: Status: ACTIVE | Noted: 2020-01-09

## 2020-01-09 PROBLEM — D18.01 HEMANGIOMA OF SKIN AND SUBCUTANEOUS TISSUE: Status: ACTIVE | Noted: 2020-01-09

## 2020-01-09 PROBLEM — D22.62 MELANOCYTIC NEVI OF LEFT UPPER LIMB, INCLUDING SHOULDER: Status: ACTIVE | Noted: 2020-01-09

## 2020-01-09 PROBLEM — D22.61 MELANOCYTIC NEVI OF RIGHT UPPER LIMB, INCLUDING SHOULDER: Status: ACTIVE | Noted: 2020-01-09

## 2020-01-09 PROBLEM — D22.71 MELANOCYTIC NEVI OF RIGHT LOWER LIMB, INCLUDING HIP: Status: ACTIVE | Noted: 2020-01-09

## 2020-01-09 PROBLEM — D22.5 MELANOCYTIC NEVI OF TRUNK: Status: ACTIVE | Noted: 2020-01-09

## 2020-01-09 PROCEDURE — ? COUNSELING

## 2020-01-09 PROCEDURE — ? LIQUID NITROGEN

## 2020-01-09 PROCEDURE — 17003 DESTRUCT PREMALG LES 2-14: CPT

## 2020-01-09 PROCEDURE — ? BIOPSY BY SHAVE METHOD

## 2020-01-09 PROCEDURE — 17000 DESTRUCT PREMALG LESION: CPT | Mod: 59

## 2020-01-09 PROCEDURE — 99203 OFFICE O/P NEW LOW 30 MIN: CPT | Mod: 25

## 2020-01-09 PROCEDURE — 11102 TANGNTL BX SKIN SINGLE LES: CPT

## 2020-01-09 ASSESSMENT — LOCATION DETAILED DESCRIPTION DERM
LOCATION DETAILED: RIGHT PROXIMAL CALF
LOCATION DETAILED: RIGHT VENTRAL PROXIMAL FOREARM
LOCATION DETAILED: RIGHT SUPERIOR LATERAL MIDBACK
LOCATION DETAILED: RIGHT ANTERIOR PROXIMAL THIGH
LOCATION DETAILED: LEFT CENTRAL MALAR CHEEK
LOCATION DETAILED: LEFT PROXIMAL CALF
LOCATION DETAILED: RIGHT DISTAL ULNAR DORSAL FOREARM
LOCATION DETAILED: LEFT PROXIMAL DORSAL FOREARM
LOCATION DETAILED: LEFT ANTERIOR PROXIMAL THIGH
LOCATION DETAILED: LEFT VENTRAL DISTAL FOREARM
LOCATION DETAILED: RIGHT ANTERIOR DISTAL UPPER ARM
LOCATION DETAILED: RIGHT MID-UPPER BACK
LOCATION DETAILED: RIGHT PROXIMAL DORSAL FOREARM
LOCATION DETAILED: NASAL SUPRATIP
LOCATION DETAILED: RIGHT VENTRAL DISTAL FOREARM
LOCATION DETAILED: RIGHT MEDIAL INFERIOR CHEST
LOCATION DETAILED: LEFT INFERIOR UPPER BACK
LOCATION DETAILED: LEFT DISTAL POSTERIOR THIGH
LOCATION DETAILED: RIGHT MEDIAL DISTAL PRETIBIAL REGION
LOCATION DETAILED: RIGHT DISTAL POSTERIOR THIGH
LOCATION DETAILED: LEFT ANTERIOR DISTAL UPPER ARM
LOCATION DETAILED: RIGHT INGUINAL CREASE

## 2020-01-09 ASSESSMENT — LOCATION SIMPLE DESCRIPTION DERM
LOCATION SIMPLE: LEFT POSTERIOR THIGH
LOCATION SIMPLE: RIGHT UPPER BACK
LOCATION SIMPLE: LEFT THIGH
LOCATION SIMPLE: LEFT CHEEK
LOCATION SIMPLE: RIGHT LOWER BACK
LOCATION SIMPLE: RIGHT CALF
LOCATION SIMPLE: RIGHT POSTERIOR THIGH
LOCATION SIMPLE: RIGHT PRETIBIAL REGION
LOCATION SIMPLE: NOSE
LOCATION SIMPLE: CHEST
LOCATION SIMPLE: RIGHT FOREARM
LOCATION SIMPLE: RIGHT THIGH
LOCATION SIMPLE: LEFT CALF
LOCATION SIMPLE: RIGHT UPPER ARM
LOCATION SIMPLE: GROIN
LOCATION SIMPLE: LEFT FOREARM
LOCATION SIMPLE: LEFT UPPER ARM
LOCATION SIMPLE: LEFT UPPER BACK

## 2020-01-09 ASSESSMENT — LOCATION ZONE DERM
LOCATION ZONE: LEG
LOCATION ZONE: NOSE
LOCATION ZONE: TRUNK
LOCATION ZONE: ARM
LOCATION ZONE: FACE

## 2020-01-09 NOTE — PROCEDURE: MIPS QUALITY
Quality 474: Zoster Vaccination Status: Shingrix Vaccination Administered or Previously Received
Quality 130: Documentation Of Current Medications In The Medical Record: Current Medications Documented
Quality 111:Pneumonia Vaccination Status For Older Adults: Pneumococcal Vaccination Previously Received
Quality 226: Preventive Care And Screening: Tobacco Use: Screening And Cessation Intervention: Patient screened for tobacco use and is an ex/non-smoker
Detail Level: Detailed
Quality 402: Tobacco Use And Help With Quitting Among Adolescents: Patient screened for tobacco and never smoked
Quality 110: Preventive Care And Screening: Influenza Immunization: Influenza immunization was not ordered or administered, reason not given

## 2020-01-09 NOTE — PROCEDURE: BIOPSY BY SHAVE METHOD
Silver Nitrate Text: The wound bed was treated with silver nitrate after the biopsy was performed.
Hemostasis: Drysol
Hide Accession Number?: No
Was A Bandage Applied: Yes
Electrodesiccation And Curettage Text: The wound bed was treated with electrodesiccation and curettage after the biopsy was performed.
Dressing: bandage
Cryotherapy Text: The wound bed was treated with cryotherapy after the biopsy was performed.
Post-Care Instructions: I reviewed with the patient in detail post-care instructions. Patient is to keep the biopsy site dry overnight, and then apply bacitracin twice daily until healed. Patient may apply hydrogen peroxide soaks to remove any crusting.
Lab: [id number]
Biopsy Method: Dermablade
Detail Level: Detailed
Billing Type: Third-Party Bill
Curettage Text: The wound bed was treated with curettage after the biopsy was performed.
Notification Instructions: Patient will be notified of biopsy results. However, patient instructed to call the office if not contacted within 2 weeks.
Electrodesiccation Text: The wound bed was treated with electrodesiccation after the biopsy was performed.
Depth Of Biopsy: dermis
Wound Care: Petrolatum
Lab Facility: 
Size Of Lesion In Cm: 1
Additional Anesthesia Volume In Cc (Will Not Render If 0): 0
Anesthesia Type: 1% lidocaine without epinephrine
Anesthesia Volume In Cc: 0.5
Consent: Written consent was obtained and risks were reviewed including but not limited to scarring, infection, bleeding, scabbing, incomplete removal, nerve damage and allergy to anesthesia.
Type Of Destruction Used: Curettage
X Size Of Lesion In Cm: 1.5
Biopsy Type: H and E

## 2021-03-23 ENCOUNTER — HOSPITAL ENCOUNTER (OUTPATIENT)
Dept: RADIOLOGY | Facility: MEDICAL CENTER | Age: 76
End: 2021-03-23
Attending: PSYCHIATRY & NEUROLOGY
Payer: MEDICARE

## 2021-03-23 DIAGNOSIS — R41.3 MEMORY LOSS: ICD-10-CM

## 2021-03-23 DIAGNOSIS — M54.10 RADICULAR SYNDROME OF LOWER LIMBS: ICD-10-CM

## 2021-03-23 PROCEDURE — 70450 CT HEAD/BRAIN W/O DYE: CPT | Mod: MH

## 2021-03-23 PROCEDURE — 72050 X-RAY EXAM NECK SPINE 4/5VWS: CPT

## 2021-04-30 ENCOUNTER — APPOINTMENT (OUTPATIENT)
Dept: NEPHROLOGY | Facility: MEDICAL CENTER | Age: 76
End: 2021-04-30
Payer: MEDICARE

## 2021-05-04 ENCOUNTER — APPOINTMENT (RX ONLY)
Dept: URBAN - METROPOLITAN AREA CLINIC 20 | Facility: CLINIC | Age: 76
Setting detail: DERMATOLOGY
End: 2021-05-04

## 2021-05-04 DIAGNOSIS — L57.0 ACTINIC KERATOSIS: ICD-10-CM

## 2021-05-04 DIAGNOSIS — L73.8 OTHER SPECIFIED FOLLICULAR DISORDERS: ICD-10-CM

## 2021-05-04 DIAGNOSIS — D18.0 HEMANGIOMA: ICD-10-CM

## 2021-05-04 PROBLEM — D48.5 NEOPLASM OF UNCERTAIN BEHAVIOR OF SKIN: Status: ACTIVE | Noted: 2021-05-04

## 2021-05-04 PROBLEM — D18.01 HEMANGIOMA OF SKIN AND SUBCUTANEOUS TISSUE: Status: ACTIVE | Noted: 2021-05-04

## 2021-05-04 PROCEDURE — ? LIQUID NITROGEN

## 2021-05-04 PROCEDURE — ? COUNSELING

## 2021-05-04 PROCEDURE — 17004 DESTROY PREMAL LESIONS 15/>: CPT

## 2021-05-04 PROCEDURE — ? BIOPSY BY SHAVE METHOD

## 2021-05-04 PROCEDURE — 99212 OFFICE O/P EST SF 10 MIN: CPT | Mod: 25

## 2021-05-04 PROCEDURE — 11102 TANGNTL BX SKIN SINGLE LES: CPT | Mod: 59

## 2021-05-04 ASSESSMENT — LOCATION SIMPLE DESCRIPTION DERM
LOCATION SIMPLE: LEFT FOREARM
LOCATION SIMPLE: RIGHT FOREARM
LOCATION SIMPLE: RIGHT HAND
LOCATION SIMPLE: LEFT UPPER ARM
LOCATION SIMPLE: RIGHT CHEEK
LOCATION SIMPLE: RIGHT FOREHEAD
LOCATION SIMPLE: LEFT PRETIBIAL REGION

## 2021-05-04 ASSESSMENT — LOCATION ZONE DERM
LOCATION ZONE: LEG
LOCATION ZONE: HAND
LOCATION ZONE: ARM
LOCATION ZONE: FACE

## 2021-05-04 ASSESSMENT — LOCATION DETAILED DESCRIPTION DERM
LOCATION DETAILED: LEFT ANTERIOR PROXIMAL UPPER ARM
LOCATION DETAILED: RIGHT CENTRAL MALAR CHEEK
LOCATION DETAILED: LEFT MEDIAL DISTAL PRETIBIAL REGION
LOCATION DETAILED: LEFT PROXIMAL PRETIBIAL REGION
LOCATION DETAILED: LEFT DISTAL PRETIBIAL REGION
LOCATION DETAILED: RIGHT SUPERIOR MEDIAL FOREHEAD
LOCATION DETAILED: LEFT PROXIMAL DORSAL FOREARM
LOCATION DETAILED: RIGHT RADIAL DORSAL HAND
LOCATION DETAILED: LEFT DISTAL DORSAL FOREARM
LOCATION DETAILED: LEFT ANTERIOR DISTAL UPPER ARM
LOCATION DETAILED: RIGHT DISTAL DORSAL FOREARM
LOCATION DETAILED: RIGHT PROXIMAL DORSAL FOREARM

## 2021-05-04 NOTE — PROCEDURE: BIOPSY BY SHAVE METHOD
Detail Level: Detailed
Depth Of Biopsy: dermis
Was A Bandage Applied: Yes
Size Of Lesion In Cm: 0.5
Biopsy Type: H and E
Biopsy Method: Dermablade
Anesthesia Type: 1% lidocaine without epinephrine
Additional Anesthesia Volume In Cc (Will Not Render If 0): 0
Hemostasis: Drysol
Wound Care: Petrolatum
Dressing: bandage
Destruction After The Procedure: No
Type Of Destruction Used: Electrodesiccation and Curettage
Curettage Text: The wound bed was treated with curettage after the biopsy was performed.
Cryotherapy Text: The wound bed was treated with cryotherapy after the biopsy was performed.
Electrodesiccation Text: The wound bed was treated with electrodesiccation after the biopsy was performed.
Electrodesiccation And Curettage Text: The wound bed was treated with electrodesiccation and curettage after the biopsy was performed. Treated x 3
Silver Nitrate Text: The wound bed was treated with silver nitrate after the biopsy was performed.
Lab: [id number]
Lab Facility: 
Consent: Written consent was obtained and risks were reviewed including but not limited to scarring, infection, bleeding, scabbing, incomplete removal, nerve damage and allergy to anesthesia.
Post-Care Instructions: I reviewed with the patient in detail post-care instructions. Patient is to keep the biopsy site dry overnight, and then apply bacitracin twice daily until healed. Patient may apply hydrogen peroxide soaks to remove any crusting.
Notification Instructions: Patient will be notified of biopsy results. However, patient instructed to call the office if not contacted within 2 weeks.
Billing Type: Third-Party Bill
Information: Selecting Yes will display possible errors in your note based on the variables you have selected. This validation is only offered as a suggestion for you. PLEASE NOTE THAT THE VALIDATION TEXT WILL BE REMOVED WHEN YOU FINALIZE YOUR NOTE. IF YOU WANT TO FAX A PRELIMINARY NOTE YOU WILL NEED TO TOGGLE THIS TO 'NO' IF YOU DO NOT WANT IT IN YOUR FAXED NOTE.

## 2021-05-25 ENCOUNTER — APPOINTMENT (OUTPATIENT)
Dept: NEPHROLOGY | Facility: MEDICAL CENTER | Age: 76
End: 2021-05-25
Payer: MEDICARE

## 2021-07-18 DIAGNOSIS — N17.9 AKI (ACUTE KIDNEY INJURY) (HCC): ICD-10-CM

## 2021-07-20 ENCOUNTER — APPOINTMENT (OUTPATIENT)
Dept: NEPHROLOGY | Facility: MEDICAL CENTER | Age: 76
End: 2021-07-20
Payer: MEDICARE

## 2022-06-10 ENCOUNTER — APPOINTMENT (OUTPATIENT)
Dept: NEUROLOGY | Facility: MEDICAL CENTER | Age: 77
End: 2022-06-10
Attending: PSYCHIATRY & NEUROLOGY
Payer: MEDICARE

## 2022-08-10 ENCOUNTER — APPOINTMENT (OUTPATIENT)
Dept: NEUROLOGY | Facility: MEDICAL CENTER | Age: 77
End: 2022-08-10
Attending: PSYCHIATRY & NEUROLOGY
Payer: MEDICARE

## 2022-08-10 NOTE — PROGRESS NOTES
No chief complaint on file.      History of present illness:  Samir Heath 77 y.o. male with Parkinson's disease complicated by orthostatic hypotension on sinemet 25/100 1 tab 3 times daily. Previously he was seen by Dr. Jose Velázquez who started him on fludrocortisone.       Past medical history:   Past Medical History:   Diagnosis Date    Allergy     ASTHMA     Enlarged prostate     Heart rate slow     Parkinson disease (HCC)        Past surgical history:   Past Surgical History:   Procedure Laterality Date    OPEN REDUCTION      TONSILLECTOMY         Family history:   Family History   Problem Relation Age of Onset    Cancer Mother         Melanoma    Other Father         low heart rate/pacemaker    Cancer Father         prostate cancer/ sarcoma    Lung Disease Paternal Uncle     Cancer Paternal Uncle     Stroke Maternal Grandmother     Hypertension Paternal Grandmother        Social history:   Social History     Socioeconomic History    Marital status:      Spouse name: Not on file    Number of children: Not on file    Years of education: Not on file    Highest education level: Not on file   Occupational History    Not on file   Tobacco Use    Smoking status: Never    Smokeless tobacco: Never   Vaping Use    Vaping Use: Never used   Substance and Sexual Activity    Alcohol use: No     Alcohol/week: 0.0 oz    Drug use: No    Sexual activity: Never     Partners: Female   Other Topics Concern    Not on file   Social History Narrative    Not on file     Social Determinants of Health     Financial Resource Strain: Not on file   Food Insecurity: Not on file   Transportation Needs: Not on file   Physical Activity: Not on file   Stress: Not on file   Social Connections: Not on file   Intimate Partner Violence: Not on file   Housing Stability: Not on file       Current medications:   Current Outpatient Medications   Medication    sertraline (ZOLOFT) 25 MG tablet    polyethylene glycol 3350 (MIRALAX) Powder    SAW  PALMETTO     No current facility-administered medications for this visit.       Medication Allergy:  Allergies   Allergen Reactions    Epinephrine Unspecified     Blood pressure/pulse goes up.    Morphine      Injected morphine, causes BP to drop    Sulfa Antibiotics [Sulfa Drugs] Itching       Review of systems:   ROS     Physical examination:   There were no vitals filed for this visit.  Neurological Exam    Labs:  I reviewed the following labs personally:  ***    Imaging:   ***    ASSESSMENT AND PLAN:  Problem List Items Addressed This Visit    None      There are no diagnoses linked to this encounter.    FOLLOW-UP:   No follow-ups on file.    Total time spent for the day for this patient unrelated to procedure time is: *** minutes. I spent *** minutes in face to face time and I spent *** minutes pre-charting and *** minutes in post-visit documentation.      ADARSH KeeneO.  [facility]